# Patient Record
Sex: FEMALE | Race: WHITE | NOT HISPANIC OR LATINO | Employment: OTHER | URBAN - METROPOLITAN AREA
[De-identification: names, ages, dates, MRNs, and addresses within clinical notes are randomized per-mention and may not be internally consistent; named-entity substitution may affect disease eponyms.]

---

## 2021-12-14 ENCOUNTER — APPOINTMENT (OUTPATIENT)
Dept: GENERAL RADIOLOGY | Facility: HOSPITAL | Age: 66
End: 2021-12-14

## 2021-12-14 ENCOUNTER — HOSPITAL ENCOUNTER (EMERGENCY)
Facility: HOSPITAL | Age: 66
Discharge: HOME | End: 2021-12-14
Attending: EMERGENCY MEDICINE | Admitting: EMERGENCY MEDICINE

## 2021-12-14 VITALS
RESPIRATION RATE: 20 BRPM | OXYGEN SATURATION: 98 % | SYSTOLIC BLOOD PRESSURE: 138 MMHG | TEMPERATURE: 97.7 F | DIASTOLIC BLOOD PRESSURE: 90 MMHG | HEART RATE: 113 BPM

## 2021-12-14 DIAGNOSIS — S68.110A: Primary | ICD-10-CM

## 2021-12-14 PROCEDURE — 25010000002 HYDROMORPHONE 1 MG/ML SOLUTION: Performed by: NURSE PRACTITIONER

## 2021-12-14 PROCEDURE — 90715 TDAP VACCINE 7 YRS/> IM: CPT | Performed by: NURSE PRACTITIONER

## 2021-12-14 PROCEDURE — 25010000002 TETANUS-DIPHTH-ACELL PERTUSSIS 5-2.5-18.5 LF-MCG/0.5 SUSPENSION PREFILLED SYRINGE: Performed by: NURSE PRACTITIONER

## 2021-12-14 PROCEDURE — 96372 THER/PROPH/DIAG INJ SC/IM: CPT

## 2021-12-14 PROCEDURE — 99283 EMERGENCY DEPT VISIT LOW MDM: CPT

## 2021-12-14 PROCEDURE — 90471 IMMUNIZATION ADMIN: CPT | Performed by: NURSE PRACTITIONER

## 2021-12-14 PROCEDURE — 73140 X-RAY EXAM OF FINGER(S): CPT

## 2021-12-14 PROCEDURE — 63710000001 ONDANSETRON ODT 4 MG TABLET DISPERSIBLE: Performed by: NURSE PRACTITIONER

## 2021-12-14 RX ORDER — LIDOCAINE HYDROCHLORIDE AND EPINEPHRINE 10; 10 MG/ML; UG/ML
10 INJECTION, SOLUTION INFILTRATION; PERINEURAL ONCE
Status: COMPLETED | OUTPATIENT
Start: 2021-12-14 | End: 2021-12-14

## 2021-12-14 RX ORDER — AMOXICILLIN AND CLAVULANATE POTASSIUM 875; 125 MG/1; MG/1
1 TABLET, FILM COATED ORAL EVERY 12 HOURS
Qty: 14 TABLET | Refills: 0 | Status: SHIPPED | OUTPATIENT
Start: 2021-12-14

## 2021-12-14 RX ORDER — AMOXICILLIN AND CLAVULANATE POTASSIUM 875; 125 MG/1; MG/1
1 TABLET, FILM COATED ORAL ONCE
Status: COMPLETED | OUTPATIENT
Start: 2021-12-14 | End: 2021-12-14

## 2021-12-14 RX ORDER — ONDANSETRON 4 MG/1
4 TABLET, ORALLY DISINTEGRATING ORAL ONCE
Status: COMPLETED | OUTPATIENT
Start: 2021-12-14 | End: 2021-12-14

## 2021-12-14 RX ORDER — BUPIVACAINE HYDROCHLORIDE 5 MG/ML
10 INJECTION, SOLUTION EPIDURAL; INTRACAUDAL ONCE
Status: COMPLETED | OUTPATIENT
Start: 2021-12-14 | End: 2021-12-14

## 2021-12-14 RX ADMIN — LIDOCAINE HYDROCHLORIDE,EPINEPHRINE BITARTRATE 1.5 ML: 10; .01 INJECTION, SOLUTION INFILTRATION; PERINEURAL at 21:42

## 2021-12-14 RX ADMIN — AMOXICILLIN AND CLAVULANATE POTASSIUM 1 TABLET: 875; 125 TABLET, FILM COATED ORAL at 19:25

## 2021-12-14 RX ADMIN — ONDANSETRON 4 MG: 4 TABLET, ORALLY DISINTEGRATING ORAL at 20:17

## 2021-12-14 RX ADMIN — HYDROMORPHONE HYDROCHLORIDE 1 MG: 1 INJECTION, SOLUTION INTRAMUSCULAR; INTRAVENOUS; SUBCUTANEOUS at 20:17

## 2021-12-14 RX ADMIN — TETANUS TOXOID, REDUCED DIPHTHERIA TOXOID AND ACELLULAR PERTUSSIS VACCINE, ADSORBED 0.5 ML: 5; 2.5; 8; 8; 2.5 SUSPENSION INTRAMUSCULAR at 21:38

## 2021-12-14 RX ADMIN — BUPIVACAINE HYDROCHLORIDE 1.5 ML: 5 INJECTION, SOLUTION EPIDURAL; INTRACAUDAL; PERINEURAL at 21:42

## 2021-12-14 NOTE — ED PROVIDER NOTES
EMERGENCY DEPARTMENT ENCOUNTER    Room Number:  04/04  Date seen:  12/14/2021  Time seen: 18:58 EST  PCP: Provider, No Known  Historian: patient  HPI:  Chief complaint:traumatic right index finger laceration  A complete HPI/ROS/PMH/PSH/SH/FH are unobtainable due to: n/a  Context:Betty Hamilton is a 66 y.o. female who presents to the ED with c/o distal tip of right index finger laceration sustained just PTA.  It is mildly painful and she brought the amputated portion here; it is in ice water.  She states her dog was choking on rawhide and she attempted to perform finger sweep and the dog clamped down and bit her finger off.  The dog was not being aggressive.  The dog is UTD on it's shots and rabies vaccines.  She has no other complaints.     Patient was placed in face mask in first look. Patient was wearing facemask when I entered the room and throughout our encounter. I wore full protective equipment throughout this patient encounter including a N95 face mask, eye shield and gloves. Hand hygiene/washing of hands was performed before donning protective equipment and after removal when leaving the room.    MEDICAL RECORD REVIEW    ALLERGIES  Codeine and Demerol [meperidine]    PAST MEDICAL HISTORY  Active Ambulatory Problems     Diagnosis Date Noted   • No Active Ambulatory Problems     Resolved Ambulatory Problems     Diagnosis Date Noted   • No Resolved Ambulatory Problems     No Additional Past Medical History       PAST SURGICAL HISTORY  No past surgical history on file.    FAMILY HISTORY  No family history on file.    SOCIAL HISTORY  Social History     Socioeconomic History   • Marital status:        REVIEW OF SYSTEMS  Review of Systems    All systems reviewed and negative except for those discussed in HPI.     PHYSICAL EXAM    ED Triage Vitals   Temp Heart Rate Resp BP SpO2   12/14/21 1839 12/14/21 1839 12/14/21 1839 12/14/21 1841 12/14/21 1839   97.7 °F (36.5 °C) 113 20 138/90 98 %      Temp src Heart Rate  Source Patient Position BP Location FiO2 (%)   12/14/21 1839 -- 12/14/21 1841 -- --   Infrared  Sitting       Physical Exam  Musculoskeletal:        Hands:       Comments: RIF distal tip amputation involving the fingernail.  She has no loss of sensory or motor function.  I do not suspect tendon injury.            I have reviewed the triage vital signs and nursing notes.      GENERAL: not distressed  HENT: nares patent  EYES: no scleral icterus  NECK: no ROM limitations  CV: regular rhythm, regular rate, no murmur, no rubs, no gallups  RESPIRATORY: normal effort, CTAB  ABDOMEN: soft  : deferred  MUSCULOSKELETAL: see diagram  NEURO: alert, moves all extremities, follows commands  SKIN: warm, dry    Wound Care    Date/Time: 12/14/2021 11:19 PM  Performed by: Qing Bellamy APRN  Authorized by: Humphrey Ying MD     Consent:     Consent obtained:  Verbal    Consent given by:  Patient    Risks discussed:  Bleeding, infection and pain    Alternatives discussed:  No treatment  Pre-procedure details:     Preparation: Patient was prepped and draped in usual sterile fashion    Anesthesia (see MAR for exact dosages):     Anesthesia method:  Nerve block    Block needle gauge:  25 G    Block anesthetic:  Lidocaine 1% WITH epi and bupivacaine 0.5% w/o epi    Block technique:  Digital block    Block injection procedure:  Anatomic landmarks identified, introduced needle, incremental injection, anatomic landmarks palpated and negative aspiration for blood    Block outcome:  Anesthesia achieved  Post-procedure details:     Patient tolerance of procedure:  Tolerated well, no immediate complications  Comments:      Excellent block obtained.  I irrigated the distal tip of RIF with 1000 cc NS.  I then applied iodaform gauze, gauze conform dressing and coban.  She is follow up in am with Dr. John.  She has asked I dispose of the finger tip that was amputated.  She was not interested in reattachment.           RADIOLOGY RESULTS  XR  Finger 2+ View Right    Result Date: 12/14/2021  XR FINGER 2+ VW RIGHT-  INDICATIONS: Dog bite  TECHNIQUE: 5 views including the right second finger  COMPARISON: None available  FINDINGS:  The tip of the right second finger appears truncated, correlate with clinical exam. No radiopaque foreign bodies are noted. 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 No acute fracture, erosion, or dislocation is identified.       As described.    This report was finalized on 12/14/2021 7:33 PM by Dr. Harsha Dhaliwal M.D.           PROGRESS, DATA ANALYSIS, CONSULTS AND MEDICAL DECISION MAKING  All labs have been independently reviewed by me.  All radiology studies have been reviewed by me and discussed with radiologist dictating the report.  EKG's independently viewed and interpreted by me unless stated otherwise. Discussion below represents my analysis of pertinent findings related to patient's condition, differential diagnosis, treatment plan and final disposition.        DDX: traumatic amputation of DIP RIF, need for tdap, finger pain    MDM:  I updated the patient's Tdap status.  See wound care.  Augmentin started.  I have communicated with Dr. John, Hand Surgery and pt will go there in am for intervention and pt has been provided location of his office.     Reviewed pt's history and workup with Dr. Ying.  After a bedside evaluation, Dr. Ying agrees with the plan of care.    The patient's history, physical exam, and lab findings were discussed with the physician, who also performed a face to face history and physical exam.  I discussed all results and noted any abnormalities with patient.  Discussed absoute need to recheck abnormalities with their family physician.  I answered any of the patient's questions.  Discussed plan for discharge, as there is no emergent indication for admission.  Pt is agreeable and understands need for follow up and repeat testing.  Pt is aware  that discharge does not mean that nothing is wrong but it indicates no emergency is present and they must continue care with their family physician.  Pt is discharged with instructions to follow up with primary care doctor to have their blood pressure rechecked.         Disposition vitals:  /90 (Patient Position: Sitting)   Pulse 113   Temp 97.7 °F (36.5 °C) (Infrared)   Resp 20   SpO2 98%       DIAGNOSIS  Final diagnoses:   Traumatic amputation of tip of right index finger, initial encounter       FOLLOW UP   Livan John MD  [address] [place] [address] [phone]    Go to   at 0730 in am         Qing Bellamy, APRN  12/14/21 [id number]

## 2021-12-14 NOTE — ED TRIAGE NOTES
Patient to er from home with c/o dog-bite to 2nd digit on right hand. Bleeding controled in triage. Patient has mask on in triage along with staff.

## 2021-12-15 NOTE — DISCHARGE INSTRUCTIONS
Leave dressing in place until seen by Dr. John in am    Take antibiotics, first dose in am    You can take 1000 mg Tylenol every 8 hours (2 of the 500 mg tablets)    Do not exceed 4 grams of tylenol per day    Return Precautions    Although you are being discharged from the ED today, I encourage you to return for worsening symptoms.  Things can, and do, change such that treatment at home with medication may not be adequate.      Specifically, return for any of the following:    Chest pain, shortness of breath, pain or nausea and vomiting not controlled by medications provided.    Please make a follow up with your Primary Care Provider for a blood pressure recheck.

## 2021-12-15 NOTE — ED NOTES
Pt left ED ambulatory with SO. Pt alert. Hand wrapped by PA. AVS reviewed. Rx x1 given. NAD noted.      Nadia Zaragoza RN  12/14/21 [id number]

## 2021-12-15 NOTE — ED PROVIDER NOTES
Brief history of present illness: 66-year-old female longtime smoker sustained amputation of the tip of the right index finger when she reached into retrieve something out of her truck and dog's mouth.  She brought the bed with her but desires not to be replaced she is worried about infection risk and prolonged healing issues.  Minimal discomfort at this time.    Physical exam:   ED Triage Vitals   Temp Heart Rate Resp BP SpO2   12/14/21 1839 12/14/21 1839 12/14/21 1839 12/14/21 1841 12/14/21 1839   97.7 °F (36.5 °C) 113 20 138/90 98 %      Temp src Heart Rate Source Patient Position BP Location FiO2 (%)   12/14/21 1839 -- 12/14/21 1841 -- --   Infrared  Sitting       Alert appropriate.  Very pleasant.  No acute distress.  Pink warm well-perfused.  No respiratory distress or tachypnea.  Patient is in fact missing the distal tip of the right index finger distal phalanx with exposed bone and partial nail amputation.  Weeping slightly.  Remainder of her hand is atraumatic.    MDM:    RADIOLOGY        Study: Right hand with fingers - 3 views    Findings: Amputation of soft tissue and distal bone right index finger distal phalanx.    Interpreted contemporaneously with treatment by me, independently viewed by me    Reviewed risk benefits and alternatives of closure in the ED with the piece of finger that she brought with her versus leaving off and doing a primary closure tomorrow with loss of the distal tip of her finger.  Patient elects for not replacing the amputated portion of her finger.  Cleanse wound, dressed wound, initiate antibiotics, outpatient follow-up with hand surgery in the a.m.  Patient agreeable with plan.    I have seen and personally evaluated this patient, discussed the case with the treating advanced practice provider, and reviewed their note. I was involved in the medical decision making during the evaluation, testing and disposition planning for this patient.     Humphrey Ying MD  12/14/21 1935